# Patient Record
Sex: MALE | Race: WHITE | Employment: PART TIME | URBAN - METROPOLITAN AREA
[De-identification: names, ages, dates, MRNs, and addresses within clinical notes are randomized per-mention and may not be internally consistent; named-entity substitution may affect disease eponyms.]

---

## 2018-11-24 ENCOUNTER — HOSPITAL ENCOUNTER (EMERGENCY)
Age: 23
Discharge: HOME | End: 2018-11-24
Attending: EMERGENCY MEDICINE
Payer: COMMERCIAL

## 2018-11-24 ENCOUNTER — APPOINTMENT (OUTPATIENT)
Dept: GENERAL RADIOLOGY | Age: 23
End: 2018-11-24
Payer: COMMERCIAL

## 2018-11-24 VITALS
TEMPERATURE: 99.5 F | RESPIRATION RATE: 18 BRPM | BODY MASS INDEX: 28.44 KG/M2 | WEIGHT: 210 LBS | OXYGEN SATURATION: 95 % | HEART RATE: 93 BPM | SYSTOLIC BLOOD PRESSURE: 106 MMHG | HEIGHT: 72 IN | DIASTOLIC BLOOD PRESSURE: 50 MMHG

## 2018-11-24 DIAGNOSIS — J18.9 PNEUMONIA DUE TO ORGANISM: Primary | ICD-10-CM

## 2018-11-24 DIAGNOSIS — J45.901 EXACERBATION OF ASTHMA, UNSPECIFIED ASTHMA SEVERITY, UNSPECIFIED WHETHER PERSISTENT: ICD-10-CM

## 2018-11-24 LAB
INFLUENZA A BY PCR: NOT DETECTED
INFLUENZA B BY PCR: NOT DETECTED

## 2018-11-24 PROCEDURE — 2580000003 HC RX 258: Performed by: NURSE PRACTITIONER

## 2018-11-24 PROCEDURE — 6370000000 HC RX 637 (ALT 250 FOR IP): Performed by: EMERGENCY MEDICINE

## 2018-11-24 PROCEDURE — 6370000000 HC RX 637 (ALT 250 FOR IP): Performed by: NURSE PRACTITIONER

## 2018-11-24 PROCEDURE — 87502 INFLUENZA DNA AMP PROBE: CPT

## 2018-11-24 PROCEDURE — 99283 EMERGENCY DEPT VISIT LOW MDM: CPT

## 2018-11-24 PROCEDURE — 71046 X-RAY EXAM CHEST 2 VIEWS: CPT

## 2018-11-24 RX ORDER — 0.9 % SODIUM CHLORIDE 0.9 %
1000 INTRAVENOUS SOLUTION INTRAVENOUS ONCE
Status: COMPLETED | OUTPATIENT
Start: 2018-11-24 | End: 2018-11-24

## 2018-11-24 RX ORDER — PREDNISONE 20 MG/1
60 TABLET ORAL ONCE
Status: COMPLETED | OUTPATIENT
Start: 2018-11-24 | End: 2018-11-24

## 2018-11-24 RX ORDER — ACETAMINOPHEN 500 MG
1000 TABLET ORAL ONCE
Status: COMPLETED | OUTPATIENT
Start: 2018-11-24 | End: 2018-11-24

## 2018-11-24 RX ORDER — PREDNISONE 10 MG/1
60 TABLET ORAL DAILY
Qty: 24 TABLET | Refills: 0 | Status: SHIPPED | OUTPATIENT
Start: 2018-11-24 | End: 2018-11-28

## 2018-11-24 RX ORDER — DOXYCYCLINE HYCLATE 100 MG
100 TABLET ORAL 2 TIMES DAILY
Qty: 14 TABLET | Refills: 0 | Status: SHIPPED | OUTPATIENT
Start: 2018-11-24 | End: 2018-12-01

## 2018-11-24 RX ORDER — DOXYCYCLINE HYCLATE 100 MG/1
100 CAPSULE ORAL ONCE
Status: COMPLETED | OUTPATIENT
Start: 2018-11-24 | End: 2018-11-24

## 2018-11-24 RX ADMIN — ACETAMINOPHEN 1000 MG: 500 TABLET ORAL at 11:01

## 2018-11-24 RX ADMIN — PREDNISONE 60 MG: 20 TABLET ORAL at 12:35

## 2018-11-24 RX ADMIN — SODIUM CHLORIDE 1000 ML: 9 INJECTION, SOLUTION INTRAVENOUS at 11:01

## 2018-11-24 RX ADMIN — DOXYCYCLINE HYCLATE 100 MG: 100 CAPSULE ORAL at 12:35

## 2018-11-24 ASSESSMENT — PAIN DESCRIPTION - PAIN TYPE: TYPE: ACUTE PAIN

## 2018-11-24 ASSESSMENT — PAIN DESCRIPTION - DESCRIPTORS: DESCRIPTORS: SORE

## 2018-11-24 ASSESSMENT — PAIN SCALES - GENERAL
PAINLEVEL_OUTOF10: 6
PAINLEVEL_OUTOF10: 4
PAINLEVEL_OUTOF10: 6

## 2018-11-24 NOTE — ED PROVIDER NOTES
Radiologist unless otherwise noted. XR CHEST STANDARD (2 VW)   Final Result   ALERT:  THIS IS AN ABNORMAL REPORT   1. Bibasilar airspace disease concerning for pneumonia or atelectasis   or combination of the above. 2. Suspected underlying mild central pulmonary vascular congestion. ED Course / Medical Decision Making     Medications   acetaminophen (TYLENOL) tablet 1,000 mg (1,000 mg Oral Given 11/24/18 1101)   0.9 % sodium chloride bolus (0 mLs Intravenous Stopped 11/24/18 1212)   predniSONE (DELTASONE) tablet 60 mg (60 mg Oral Given 11/24/18 1235)   doxycycline hyclate (VIBRAMYCIN) capsule 100 mg (100 mg Oral Given 11/24/18 1235)        Consult(s):   None    Procedure(s):   none    Re-eval: On reevaluation patient's fever has improved, heart rate has improved. Patient is not hypoxic, not tachypneic and in no acute respiratory distress using no accessory muscles. Secondary to patient's x-ray patient will be treated for pneumonia at this time. He will also be given prednisone. Patient is comfortable and agreeable with this plan. Patient is nontoxic in appearance. Does not require admission at this time. Patient is instructed to follow up with primary care provider and return to the ER for new or worsening symptoms. Medical Decision Making:    Based on moderate  suspicion for pneumonia as per history/physical findings, imaging was done and confirms the above findings. Upper respiratory infection is unlikely to be likely viral in etiology. Patient will be treated with antibiotics at this time. Not hypoxic,  Patient is well appearing, non toxic and appropriate for outpatient management. Plan is for symptom management with PCP follow up. Patient presents to the emergency department with a two-week history of a cough and a few day history of fever. X-ray and flu was obtained, rapid influenza is negative.  X-ray was concerning for pneumonia, as well as my clinical exam patient will be treated for this at

## 2020-01-06 ENCOUNTER — HOSPITAL ENCOUNTER (EMERGENCY)
Age: 25
Discharge: HOME | End: 2020-01-06
Payer: COMMERCIAL

## 2020-01-06 ENCOUNTER — APPOINTMENT (OUTPATIENT)
Dept: GENERAL RADIOLOGY | Age: 25
End: 2020-01-06
Payer: COMMERCIAL

## 2020-01-06 VITALS
HEIGHT: 72 IN | HEART RATE: 83 BPM | OXYGEN SATURATION: 99 % | SYSTOLIC BLOOD PRESSURE: 132 MMHG | WEIGHT: 215 LBS | DIASTOLIC BLOOD PRESSURE: 62 MMHG | RESPIRATION RATE: 16 BRPM | BODY MASS INDEX: 29.12 KG/M2 | TEMPERATURE: 97.9 F

## 2020-01-06 PROCEDURE — 73610 X-RAY EXAM OF ANKLE: CPT

## 2020-01-06 PROCEDURE — 99283 EMERGENCY DEPT VISIT LOW MDM: CPT

## 2020-01-06 RX ORDER — IBUPROFEN 800 MG/1
800 TABLET ORAL EVERY 6 HOURS PRN
Qty: 16 TABLET | Refills: 0 | Status: SHIPPED | OUTPATIENT
Start: 2020-01-06 | End: 2022-05-18

## 2020-01-06 ASSESSMENT — PAIN SCALES - GENERAL: PAINLEVEL_OUTOF10: 6

## 2020-01-06 NOTE — ED NOTES
Patient discharged with belongings. Discussed care instructions, follow-up instructions, medications and when to return to the hospital. Patient verbalizes understanding and has no further questions at this time. Electronically signed by Kirti Harman.  CALOS Whitehead on 1/6/2020 at 2:21 PM       Zhao Freire RN  01/06/20 [id number]

## 2020-01-07 NOTE — ED PROVIDER NOTES
[facility]     Department of Emergency Medicine   ED  Provider Note  Admit Date/RoomTime: 1/6/2020  1:07 PM  ED Room: /  Chief Complaint:   Ankle Injury (Right ankle injury last night while playing basketball )    History of Present Illness   Source of history provided by:  patient. History/Exam Limitations: none. Courtney Mora is a 27 y.o. old male presenting to the emergency department by private vehicle, for Right ankle pain which occured 1 day(s) prior to arrival.  Cause of complaint: fall, sports injury while playing basketball. There has been a history of no prior problems with this area in the past.  Since onset the symptoms have been mild in degree with swelling. His pain is aggraveated by standing, walking and relieved by nothing. Tetanus Status: up to date. ROS    Pertinent positives and negatives are stated within HPI, all other systems reviewed and are negative. Past Surgical History:   Procedure Laterality Date    APPENDECTOMY      TONSILLECTOMY      WISDOM TOOTH EXTRACTION     Social History:  reports that he has never smoked. He has never used smokeless tobacco. He reports current alcohol use. He reports that he does not use drugs. Family History: family history is not on file. Allergies: Other    Physical Exam           ED Triage Vitals   BP Temp Temp Source Pulse Resp SpO2 Height Weight   01/06/20 1306 01/06/20 1306 01/06/20 1306 01/06/20 1306 01/06/20 1306 01/06/20 1306 01/06/20 1305 01/06/20 1305   132/62 97.9 °F (36.6 °C) Oral 83 16 99 % 6' (1.829 m) 215 lb (97.5 kg)       Oxygen Saturation Interpretation: Normal.    Constitutional:  Alert, development consistent with age. Neck:  Normal ROM. Supple. Ankle:  Right Lateral and Medial:              Tenderness:  mild. Swelling: Mild. Deformity: no.             ROM: full range with pain. Skin:  no erythema, rash or wounds noted. Neurovascular:               Motor deficit: none. Sensory deficit:   none. Pulse deficit: none. Capillary refill: normal.  Knee:              Tenderness:  none. Swelling: None. Deformity: no.             ROM: full range of motion. Skin:  no erythema, rash or wounds noted. Foot:              Tenderness:  none. Swelling: None. Deformity: no.             ROM: full range of motion. Skin:  no erythema, rash or wounds noted. Gait:  normal.   Lymphatics: No lymphangitis or adenopathy noted. Neurological:  Oriented. Motor functions intact. Lab / Imaging Results   (All laboratory and radiology results have been personally reviewed by myself)  Labs:  No results found for this visit on 01/06/20. Imaging: All Radiology results interpreted by Radiologist unless otherwise noted. XR ANKLE RIGHT (MIN 3 VIEWS)   Final Result   Extensive soft tissue swelling        ED Course / Medical Decision Making   Medications - No data to display     Consults:   None    Procedure(s):   none    MDM:       Films were obtained based on moderate suspicion for bony injury as per history/physical findings . Plan is subsequently for symptom control, limited use and  immobilization with appropriate outpatient follow-up. Counseling: The emergency provider has spoken with the patient and discussed todays results, in addition to providing specific details for the plan of care and counseling regarding the diagnosis and prognosis. Questions are answered at this time and they are agreeable with the plan. Assessment      1.  Sprain of right ankle, unspecified ligament, initial encounter      Plan   Discharge to home  Patient condition is good    New Medications     Discharge Medication List as of 1/6/2020  2:12 PM      START taking these medications    Details   ibuprofen (ADVIL;MOTRIN) 800 MG tablet Take 1 tablet by mouth every 6 hours as needed for Pain, Disp-16 tablet, R-0Print           Electronically signed by GARO Malone CNP   DD: 1/7/20  **This report was transcribed using voice recognition software. Every effort was made to ensure accuracy; however, inadvertent computerized transcription errors may be present.   END OF ED PROVIDER NOTE        GARO Malone CNP  01/07/20 [facility] Evelyn Mortimer, APRN - CNP  01/07/20 [id number]

## 2022-05-18 ENCOUNTER — HOSPITAL ENCOUNTER (EMERGENCY)
Age: 27
Discharge: HOME | End: 2022-05-18
Payer: COMMERCIAL

## 2022-05-18 VITALS
OXYGEN SATURATION: 100 % | HEART RATE: 79 BPM | WEIGHT: 230 LBS | RESPIRATION RATE: 20 BRPM | BODY MASS INDEX: 31.15 KG/M2 | DIASTOLIC BLOOD PRESSURE: 99 MMHG | TEMPERATURE: 98.5 F | HEIGHT: 72 IN | SYSTOLIC BLOOD PRESSURE: 157 MMHG

## 2022-05-18 DIAGNOSIS — R22.0 LIP SWELLING: Primary | ICD-10-CM

## 2022-05-18 PROCEDURE — 99283 EMERGENCY DEPT VISIT LOW MDM: CPT

## 2022-05-18 RX ORDER — CEPHALEXIN 500 MG/1
500 CAPSULE ORAL 4 TIMES DAILY
Qty: 28 CAPSULE | Refills: 0 | Status: SHIPPED | OUTPATIENT
Start: 2022-05-18 | End: 2022-05-25

## 2022-05-18 RX ORDER — DIPHENHYDRAMINE HCL 25 MG
25 CAPSULE ORAL EVERY 6 HOURS PRN
Qty: 20 CAPSULE | Refills: 0 | Status: SHIPPED | OUTPATIENT
Start: 2022-05-18 | End: 2022-05-28

## 2022-05-18 RX ORDER — SULFAMETHOXAZOLE AND TRIMETHOPRIM 800; 160 MG/1; MG/1
1 TABLET ORAL 2 TIMES DAILY
Qty: 14 TABLET | Refills: 0 | Status: SHIPPED | OUTPATIENT
Start: 2022-05-18 | End: 2022-05-25

## 2022-05-18 ASSESSMENT — PAIN - FUNCTIONAL ASSESSMENT: PAIN_FUNCTIONAL_ASSESSMENT: NONE - DENIES PAIN

## 2022-05-18 NOTE — Clinical Note
Yany Riddle was seen and treated in our emergency department on 5/18/2022. He may return to work on 05/20/2022. If you have any questions or concerns, please don't hesitate to call.       Falguni Moya, GARO - CNP

## 2022-05-18 NOTE — ED NOTES
Patient here with Left lower unilateral lip swelling onset yesterday and worse today, denies any new medications (Only takes albuterol as needed). No resp distress noted or c/o, no throat swelling, no rashes noted. Patient denies injury, denies mouth or teeth pain or bleeding, no c/o mouth ulcers.   Lungs Clear to auscultation     Dania Alves RN  05/18/22 [id number]

## 2022-05-20 NOTE — ED PROVIDER NOTES
lb (104.3 kg)   SpO2 100%   BMI 31.19 kg/m²   Oxygen Saturation Interpretation: Normal      ---------------------------------------------------PHYSICAL EXAM--------------------------------------      Constitutional/General: Alert and oriented x3, well appearing, non toxic in NAD  Head: NC/AT  Eyes: PERRL, EOMI  Mouth: Oropharynx clear, handling secretions, no trismus. Mild swelling to the lower lip left lateral aspect with mild erythema there is no warmth. There are no open wounds. There is no tongue deviation there is no oral swelling.negative trismus , negative drool,   Neck: Supple, full ROM, no meningeal signs  Pulmonary: Lungs clear to auscultation bilaterally, no wheezes, rales, or rhonchi. Not in respiratory distress  Cardiovascular:  Regular rate and rhythm, no murmurs, gallops, or rubs. 2+ distal pulses  Abdomen: Soft, non tender, non distended,   Extremities: Moves all extremities x 4. Warm and well perfused  Skin: warm and dry without rash  Neurologic: GCS 15,  Psych: Normal Affect      ------------------------------ ED COURSE/MEDICAL DECISION MAKING----------------------  Medications - No data to display      Medical Decision Making: At this time the patient is without objective evidence of an acute process requiring hospitalization or inpatient management. They have remained hemodynamically stable throughout their entire ED visit and are stable for discharge with outpatient follow-up. The plan has been discussed in detail and they are aware of the specific conditions for emergent return, as well as the importance of follow-up. He was placed on medication for his symptoms including antibiotics for possible cellulitis. Patient at this time is nontoxic in appearance is in no distress he has no oral swelling there is no stridor. Patient has no fevers. Patient is educated on cool compresses anti-inflammatory medication and Benadryl to aid in the swelling.   Patient to follow-up with his primary care physician is agreeable to plan of care all questions were answered. Patient also educated to return the emergency department should any symptoms worsen at any time. Counseling: The emergency provider has spoken with the patient and discussed todays results, in addition to providing specific details for the plan of care and counseling regarding the diagnosis and prognosis. Questions are answered at this time and they are agreeable with the plan.      --------------------------------- IMPRESSION AND DISPOSITION ---------------------------------    IMPRESSION  1.  Lip swelling        DISPOSITION  Disposition: Discharge to home  Patient condition is good                  GARO Anderson CNP  05/19/22 [id number]